# Patient Record
Sex: FEMALE | Race: WHITE
[De-identification: names, ages, dates, MRNs, and addresses within clinical notes are randomized per-mention and may not be internally consistent; named-entity substitution may affect disease eponyms.]

---

## 2018-04-07 ENCOUNTER — HOSPITAL ENCOUNTER (EMERGENCY)
Dept: HOSPITAL 44 - ED | Age: 68
Discharge: HOME | End: 2018-04-07
Payer: COMMERCIAL

## 2018-04-07 VITALS
DIASTOLIC BLOOD PRESSURE: 75 MMHG | SYSTOLIC BLOOD PRESSURE: 149 MMHG | SYSTOLIC BLOOD PRESSURE: 149 MMHG | SYSTOLIC BLOOD PRESSURE: 149 MMHG | SYSTOLIC BLOOD PRESSURE: 149 MMHG | DIASTOLIC BLOOD PRESSURE: 75 MMHG | SYSTOLIC BLOOD PRESSURE: 149 MMHG | DIASTOLIC BLOOD PRESSURE: 75 MMHG | DIASTOLIC BLOOD PRESSURE: 75 MMHG | DIASTOLIC BLOOD PRESSURE: 75 MMHG

## 2018-04-07 DIAGNOSIS — N39.0: Primary | ICD-10-CM

## 2018-04-07 PROCEDURE — 87086 URINE CULTURE/COLONY COUNT: CPT

## 2018-04-07 PROCEDURE — A9270 NON-COVERED ITEM OR SERVICE: HCPCS

## 2018-04-07 PROCEDURE — 99283 EMERGENCY DEPT VISIT LOW MDM: CPT

## 2018-04-07 PROCEDURE — 81002 URINALYSIS NONAUTO W/O SCOPE: CPT

## 2018-04-07 NOTE — ED PHYSICIAN DOCUMENTATION
General Adult





- HISTORIAN


Historian: patient





- HPI


Stated Complaint: urinary track infection 


Chief Complaint: Female Urogenital Problems


Onset: days ago (2)


Timing: still present, worse


Severity: mild


Further Comments: yes (She states she has frequent issues with UTI's. She is in 

the process of seeing a urologist. She has had a fever today at home. She has 

urgency and feeling pressure and pain in bladder area and slight pain on her 

left lower back she is worried is kidney pain. She denies any visable blood in 

her urine .)


Last known Well Date: 04/04/18


Last Known Well Time: 08:00


Last known Well Code/Unknown Code: Unknown





- ROS


CONST: fever, recent illness (UTI )


EYES/ENT: none


GI/: problems urinating, nausea


MS/SKIN/LYMPH: denies: rash


NEURO/PSYCH: denies: headache





- PAST HX


Past History: CHF, hypertension, other (bladder sling, Hypothyroidism )


Surgeries/Procedures: other (bladder sling )


Immunizations: UTD


Allergies/Adverse Reactions: 


 Allergies











Allergy/AdvReac Type Severity Reaction Status Date / Time


 


codeine Allergy   Verified 04/07/18 21:12


 


oxytetracycline Allergy   Verified 04/07/18 21:12





[From Terramycin]     


 


oxytetracycline HCl Allergy   Verified 04/07/18 21:12





[From Terramycin]     

















- SOCIAL HX


Smoking History: non-smoker


Alcohol Use: none


Drug Use: none





- FAMILY HX


Family History: No





- REVIEWED ASSESSMENTS


Nursing Assessment  Reviewed: Yes


Vitals Reviewed: Yes





Progress





- Progress


Progress: 





2045: refuses IV or lab would like to take oral meds and she states she will 

increase fluids DG 





General Adult Physical Exam





- PHYSICAL EXAM


GENERAL APPEARANCE: no distress


EENT: eye inspection normal


NECK: normal inspection


RESPIRATORY: no resp distress, chest non-tender, breath sounds normal


CVS: reg rate & rhythm, heart sounds normal, equal pulses, no murmur


ABDOMEN: soft, normal bowel sounds, tenderness (bladder and mild right CVA )


BACK: normal inspection, CVA tenderness (R)


SKIN: warm/dry, normal color


EXTREMITIES: non-tender, normal range of motion, no evidence of injury, no edema


NEURO: oriented X3, CN's nml as tested, motor nml, sensation nml





Discharge


Clincal Impression: 


UTI (urinary tract infection)


Qualifiers:


 Urinary tract infection type: site unspecified Hematuria presence: without 

hematuria Qualified Code(s): N39.0 - Urinary tract infection, site not specified





Referrals: 


Primary Doctor,No [Primary Care Provider] - 2 Days


Comments: 





1. Cipro 500 mg BID x 10 days


2. Zofran 4 mg take 1 by mouth every 8 hours as needed for nausea


3. Increase fluids


4. Keep appt with Urology 


5. Return to ER for increased symptoms 


Condition: Stable


Disposition: 01 HOME, SELF-CARE


Decision to Admit: NO


Date of Decison to Admit: 04/07/18


Decision Time: 20:53

## 2018-04-09 LAB
APPEARANCE UR: (no result)
COLOR,URINE: YELLOW
PH UR STRIP: 7 [PH] (ref 5–8)
RBC UR QL: (no result)
UROBILINOGEN URINE: 0.2 EU (ref 0.2–1)

## 2018-04-17 ENCOUNTER — HOSPITAL ENCOUNTER (OUTPATIENT)
Dept: HOSPITAL 44 - CARD | Age: 68
End: 2018-04-17
Attending: INTERNAL MEDICINE
Payer: COMMERCIAL

## 2018-04-17 DIAGNOSIS — R06.09: ICD-10-CM

## 2018-04-17 DIAGNOSIS — I50.9: Primary | ICD-10-CM

## 2018-04-17 DIAGNOSIS — I10: ICD-10-CM

## 2018-04-17 DIAGNOSIS — E78.5: ICD-10-CM

## 2018-04-24 ENCOUNTER — HOSPITAL ENCOUNTER (OUTPATIENT)
Dept: HOSPITAL 44 - LAB | Age: 68
End: 2018-04-24
Attending: INTERNAL MEDICINE
Payer: COMMERCIAL

## 2018-04-24 DIAGNOSIS — E05.90: Primary | ICD-10-CM

## 2018-04-24 PROCEDURE — 84445 ASSAY OF TSI GLOBULIN: CPT

## 2018-04-24 PROCEDURE — 86376 MICROSOMAL ANTIBODY EACH: CPT

## 2018-04-24 PROCEDURE — 84481 FREE ASSAY (FT-3): CPT

## 2018-04-24 PROCEDURE — 36415 COLL VENOUS BLD VENIPUNCTURE: CPT

## 2018-04-24 PROCEDURE — 84443 ASSAY THYROID STIM HORMONE: CPT

## 2018-04-24 PROCEDURE — 84439 ASSAY OF FREE THYROXINE: CPT

## 2018-04-27 LAB — TSI ACT/NOR SER: 1.66

## 2018-05-02 ENCOUNTER — HOSPITAL ENCOUNTER (OUTPATIENT)
Dept: HOSPITAL 44 - LAB | Age: 68
End: 2018-05-02
Attending: INTERNAL MEDICINE
Payer: COMMERCIAL

## 2018-05-02 DIAGNOSIS — I50.9: Primary | ICD-10-CM

## 2018-05-02 PROCEDURE — 83880 ASSAY OF NATRIURETIC PEPTIDE: CPT

## 2018-05-02 PROCEDURE — 36415 COLL VENOUS BLD VENIPUNCTURE: CPT

## 2018-09-11 ENCOUNTER — HOSPITAL ENCOUNTER (OUTPATIENT)
Dept: HOSPITAL 44 - CARD | Age: 68
End: 2018-09-11
Attending: INTERNAL MEDICINE
Payer: COMMERCIAL

## 2018-09-11 DIAGNOSIS — I10: Primary | ICD-10-CM

## 2018-09-11 DIAGNOSIS — E78.5: ICD-10-CM

## 2018-10-29 ENCOUNTER — HOSPITAL ENCOUNTER (EMERGENCY)
Dept: HOSPITAL 44 - ED | Age: 68
Discharge: HOME | End: 2018-10-29
Payer: COMMERCIAL

## 2018-10-29 VITALS
SYSTOLIC BLOOD PRESSURE: 121 MMHG | DIASTOLIC BLOOD PRESSURE: 87 MMHG | DIASTOLIC BLOOD PRESSURE: 87 MMHG | SYSTOLIC BLOOD PRESSURE: 121 MMHG

## 2018-10-29 DIAGNOSIS — R07.9: Primary | ICD-10-CM

## 2018-10-29 LAB
APPEARANCE UR: (no result)
BASOPHILS NFR BLD: 0.5 % (ref 0–1.5)
BASOPHILS NFR BLD: 0.5 % (ref 0–1.5)
COLOR,URINE: (no result)
EGFR (NON-AFRICAN): > 60
EOSINOPHIL NFR BLD: 1.3 % (ref 0–6.8)
EOSINOPHIL NFR BLD: 1.5 % (ref 0–6.8)
MCH RBC QN AUTO: 30 PG (ref 28–34)
MCH RBC QN AUTO: 30.1 PG (ref 28–34)
MCV RBC AUTO: 90 FL (ref 80–100)
MCV RBC AUTO: 90 FL (ref 80–100)
MONOCYTES %: 5.8 % (ref 0–11)
MONOCYTES %: 6.9 % (ref 0–11)
NEUTROPHILS #: 11.5 # K/UL (ref 1.4–7.7)
NEUTROPHILS #: 12.2 # K/UL (ref 1.4–7.7)
PH UR STRIP: 7 [PH] (ref 5–8)
RBC UR QL: (no result)
UROBILINOGEN URINE: 0.2 EU (ref 0.2–1)

## 2018-10-29 PROCEDURE — 84484 ASSAY OF TROPONIN QUANT: CPT

## 2018-10-29 PROCEDURE — 81002 URINALYSIS NONAUTO W/O SCOPE: CPT

## 2018-10-29 PROCEDURE — 96374 THER/PROPH/DIAG INJ IV PUSH: CPT

## 2018-10-29 PROCEDURE — 71046 X-RAY EXAM CHEST 2 VIEWS: CPT

## 2018-10-29 PROCEDURE — 93005 ELECTROCARDIOGRAM TRACING: CPT

## 2018-10-29 PROCEDURE — 36415 COLL VENOUS BLD VENIPUNCTURE: CPT

## 2018-10-29 PROCEDURE — 85379 FIBRIN DEGRADATION QUANT: CPT

## 2018-10-29 PROCEDURE — 82550 ASSAY OF CK (CPK): CPT

## 2018-10-29 PROCEDURE — 87086 URINE CULTURE/COLONY COUNT: CPT

## 2018-10-29 PROCEDURE — S1016 NON-PVC INTRAVENOUS ADMINIST: HCPCS

## 2018-10-29 PROCEDURE — 85025 COMPLETE CBC W/AUTO DIFF WBC: CPT

## 2018-10-29 PROCEDURE — 80053 COMPREHEN METABOLIC PANEL: CPT

## 2018-10-29 RX ADMIN — ALUMINUM HYDROXIDE AND MAGNESIUM HYDROXIDE ONE ML: 200; 200 SUSPENSION ORAL at 15:01

## 2018-10-29 RX ADMIN — LIDOCAINE HYDROCHLORIDE ONE MG: 20 SOLUTION ORAL; TOPICAL at 15:01

## 2018-10-29 RX ADMIN — KETOROLAC TROMETHAMINE ONE MG: 30 INJECTION, SOLUTION INTRAMUSCULAR at 15:01

## 2018-10-29 RX ADMIN — ASPIRIN 81 MG ONE MG: 81 TABLET ORAL at 14:54

## 2018-10-29 NOTE — ED PHYSICIAN DOCUMENTATION
Chest Pain





- HISTORIAN


Historian: patient





- HPI


Stated Complaint: chest pain and epigastric pain 


Chief Complaint: Chest Pain


Onset: hours (5)


Timing: gradual onset


Duration: constant


Last known Well Date: 10/29/18


Last Known Well Time: 09:00


Last known Well Code/Unknown Code: Unknown


Context: other (not sure if correlation to activity )


Severity: moderate


Quality: sharp, stabbing


Chest Pain Radiation: arms (left ), epigastric


Chest Pain Signs/Symptoms: denies: nausea, vomiting, dyspnea


Worsened By: deep breaths


Relieved By: nothing


Further Comments: yes (She states she started last night with indigestion and 

then this am around 10 with chest pain that moved up and into her left arm. She 

states she has increased pain with deep breaths. She has not tried any OTC meds 

(she did try TUMS last night) She denies any sweats or nausea . No other 

complaints.)





- ROS


CONST: none


MS/LYMPH: none


GI/: abdominal pain.  denies: problems urinating, vomiting, nausea


SKIN/ENDO: none


NEURO/PSYCH: none





- PAST HX


MI risk factors: other (bladder issue )


Allergies/Adverse Reactions: 


                                    Allergies











Allergy/AdvReac Type Severity Reaction Status Date / Time


 


codeine Allergy   Verified 10/29/18 15:03


 


oxytetracycline Allergy   Verified 10/29/18 15:03





[From Terramycin]     


 


oxytetracycline HCl Allergy   Verified 10/29/18 15:03





[From Terramycin]     











Home Medications: 


                                Ambulatory Orders











 Medication  Instructions  Recorded


 


Carvedilol [Coreg] 25 mg PO BID 10/29/18


 


Fluticasone Propionate [Flonase] 1 spray IN DAILY 10/29/18


 


Hydrochlorothiazide 25 mg PO DAILY 10/29/18


 


Sertraline HCl 100 mg PO BID 10/29/18














- SOCIAL HX


Smoking History: non-smoker


Alcohol Use: none


Drug Use: none





- FAMILY HX


Family HX: none





- VITAL SIGNS


Vital Signs: 





                                   Vital Signs











Temp Pulse Resp BP Pulse Ox


 


          149/75    


 


          04/07/18 21:13   














- REVIEWED ASSESSMENTS


Nursing Assessment  Reviewed: Yes


Vitals Reviewed: Yes





Progress





- Progress


Progress: 





1530: discussed results and findings. She has less to no chest pain. She has 

decreased epigastric pain. She has some mild pain over lower abdomen with 

palpation - she reports this is likely from her bladder issues. She is currently

 working with Urology on a bladder issue. She has intervention CT etc on 

11.06.2018. Toshia Jacobs's office was called on 10.11.2018 her WBC was 7.84 DG


1600: second lab drawn - improved WBC DG 


1620: Discussed results and plan she is agreeable DG 





ED Results Lab/Radiology





- Radiology


Radiology Impressions: 





PA and lateral chest 





History: Chest pain 





PA and lateral chest dated October 29, 2018 is without prior radiographs for 

comparison. 





The cardiomediastinal silhouette is normal. Pulmonary vascularity is normal. 

Lungs are clear. 





Impression: 





No active disease. 


Electronically signed on Oct 29, 2018 2:56:39 PM CDT by: 


Karen Painting





Chest Pain Physical Exam





- EXAM


General Appearance: alert, mild distress


EENT: eye inspection normal, ENT inspection normal


Neck: nml inspection, no carotid bruit


Respiratory: no resp. distress, nml breath sounds, resp.distress, other (chest 

(mid/left with palpation) )


CVS: reg. rate & rhythm, no murmur


Abdomen: soft, tenderness (epigastric area with palpation )


Skin: warm/dry, normal color


Extremities: non-tender, normal range of motion, no evidence of injury, no edema


Neuro: oriented X3





Discharge


Clincal Impression: 


Chest pain


Qualifiers:


 Chest pain type: unspecified Qualified Code(s): R07.9 - Chest pain, unspecified





Referrals: 


Serena Guaman FNP [Primary Care Provider] - 2 Days


Comments: 





1. Ibuprofen as directed for chest wall pain 


2. Call KAUSHIK Jacobs for follow up lab in 2 days


3. Return to ER for any further concerns 


Condition: Stable


Disposition: 01 HOME, SELF-CARE


Decision to Admit: NO


Date of Decison to Admit: 10/29/18


Decision Time: 16:24

## 2019-02-07 ENCOUNTER — HOSPITAL ENCOUNTER (OUTPATIENT)
Dept: HOSPITAL 44 - RAD | Age: 69
End: 2019-02-07
Attending: FAMILY MEDICINE
Payer: COMMERCIAL

## 2019-02-07 DIAGNOSIS — M25.512: Primary | ICD-10-CM

## 2019-02-07 PROCEDURE — 73030 X-RAY EXAM OF SHOULDER: CPT

## 2019-02-07 PROCEDURE — 73060 X-RAY EXAM OF HUMERUS: CPT

## 2019-02-07 NOTE — DIAGNOSTIC IMAGING REPORT
LIA MOSQUEDA 



[facility]



[address] [facility].[address] [address]



 



 



 



 



Report Submission Date: 2019 3:22:34 PM CST



Patient   Study 

Name: LAMAR POST   Date: 2019 2:59:00 PM CST 

MRN: [id number]   Modality Type: DX 

Gender: F   Description: SHOULDER 2 VIEWS OR MORE 

: 54   Institution: [facility] 

Physician: LIA MOSQUEDA

    Accession:  [id number] 



 



 





Examination: Plain film left shoulder 



History: Pain fell on ice 



Comparison exams: None provided 



Findings: 2 views of the left shoulder demonstrates osteopenia.   No evidence 
for fracture or dislocation. No soft tissue abnormality 



Impression: No acute osseous process.



 





Electronically signed on 2019 3:22:34 PM CST by:



Ephraim VALENZUELA

## 2019-02-07 NOTE — DIAGNOSTIC IMAGING REPORT
<p>Your browser does not support iframes.</p>  



LIA MOSQUEDA 



[facility]



[address] [facility].[address] [address]



 



 



 



 



Report Submission Date: 2019 3:21:28 PM CST



Patient   Study 

Name: LAMAR POST   Date: 2019 2:32:19 PM CST 

MRN: [id number]   Modality Type: DX 

Gender: F   Description: HUMERUS 2 VIEWS OR MORE 

: 54   Institution: [facility] 

Physician: LIA MOSQUEDA

    Accession:  [id number] 



 



 





Examination: Plain film left humerus 



History: Pain fell on ice 



Comparison exams: None provided 



Findings: 2 views of the left humerus demonstrate normal cortical margins. No 
fracture.  No dislocation.  No joint effusion 



Impression: No acute osseous abnormality.



 





Electronically signed on 2019 3:21:28 PM CST by:



Ephraim VALENZUELA

## 2019-07-28 VITALS — SYSTOLIC BLOOD PRESSURE: 136 MMHG | DIASTOLIC BLOOD PRESSURE: 64 MMHG

## 2019-12-10 ENCOUNTER — HOSPITAL ENCOUNTER (OUTPATIENT)
Dept: HOSPITAL 44 - RAD | Age: 69
End: 2019-12-10
Attending: PODIATRIST
Payer: COMMERCIAL

## 2019-12-10 DIAGNOSIS — M79.671: ICD-10-CM

## 2019-12-10 DIAGNOSIS — M25.572: Primary | ICD-10-CM

## 2019-12-10 PROCEDURE — 73610 X-RAY EXAM OF ANKLE: CPT

## 2019-12-10 PROCEDURE — 73630 X-RAY EXAM OF FOOT: CPT

## 2019-12-10 NOTE — DIAGNOSTIC IMAGING REPORT
PATIENT MR#:   [id number]

PATIENT ACCT#: [id number]

PATIENT NAME:  LAMAR POST 

YOB: 1950

REFERRING PHYSICIAN: YASMIN KELLY

EXAM DATE:        12/10/2019

ACCESSION NUMBER: [id number]

EXAM DESCRIPTION: FOOT 3 VIEWS OR MORE





CLINICAL HISTORY: RT FOOT PAIN AND CUBOID BUMP



COMPARISON: No study for comparison is available at the time of interpretation.





TECHNIQUE: DX right foot, 3 views    



Osseous structures: The osseous structures are normal with no evidence of acute fracture or dislocati
on.  Plantar

calcaneal spur at the plantar fascia insertion, which may predispose to plantar fasciitis.  Posterior
 calcaneal spur at

the Achilles tendon insertion, which may indicate Achilles tendinosis. 



Joint spaces: 5 mm osteophyte spur at the lateral aspect of the calcaneocuboid articulation. Moderate
 degenerative

arthritis of the calcaneocuboid, naviculocuneiform and tarsometatarsal joints. 



Soft tissues: There is normal appearance of the soft tissues with no radiopaque foreign body seen. 





IMPRESSION: 



1. 5 mm bone spur at the calcaneocuboid articulation consistent with bony protrusion on physical exam
.



2. 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



3. Posterior and plantar calcaneal spurs.



Read by:        Dr. Wyatt Casper

Transcribed by: Wyatt Casper

Transcribed Date: 12/10/2019 3:00:26 PM

Electronically signed by: Dr. Wyatt Casper

Date signed: 12/10/2019 3:00:44 PM

## 2019-12-10 NOTE — DIAGNOSTIC IMAGING REPORT
PATIENT MR#:   [id number]

PATIENT ACCT#: [id number]

PATIENT NAME:  LAMAR POST 

YOB: 1950

REFERRING PHYSICIAN: YASMIN KELLY

EXAM DATE:        12/10/2019

ACCESSION NUMBER: [id number]

EXAM DESCRIPTION: ANKLE 3 VIEWS OR MORE





CLINICAL HISTORY: LATERAL LEFT ANKLE PAIN



COMPARISON: No study for comparison is available at the time of interpretation.





TECHNIQUE: DX left ankle, 3 views    



Osseous structures: Chronic deformity of the distal tibial diaphysis consistent with healed fracture.
  No acute

fracture.  Plantar calcaneal spur at the plantar fascia insertion, which may predispose to plantar fa
sciitis.  Posterior

calcaneal spur at the Achilles tendon insertion, which may indicate Achilles tendinosis. 



Joint spaces: Mild irregularity of the talar dome likely represents posttraumatic arthritis. 



Soft tissues: Subcutaneous calcifications are consistent with phleboliths. 





IMPRESSION: 



1. Mild irregularity of the talar dome likely represents sequelae of posttraumatic arthritis.



2. Healed distal tibial fracture.



3. Posterior and plantar calcaneal spurs.





Read by:        Dr. Wyatt Casper

Transcribed by: yWatt Casper

Transcribed Date: 12/10/2019 3:06:42 PM

Electronically signed by: Dr. Wyatt Casper

Date signed: 12/10/2019 3:06:42 PM